# Patient Record
Sex: FEMALE | Race: WHITE | HISPANIC OR LATINO | URBAN - METROPOLITAN AREA
[De-identification: names, ages, dates, MRNs, and addresses within clinical notes are randomized per-mention and may not be internally consistent; named-entity substitution may affect disease eponyms.]

---

## 2023-03-31 ENCOUNTER — WEB ENCOUNTER (OUTPATIENT)
Dept: URBAN - METROPOLITAN AREA CLINIC 25 | Facility: CLINIC | Age: 33
End: 2023-03-31

## 2023-04-06 ENCOUNTER — OFFICE VISIT (OUTPATIENT)
Dept: URBAN - METROPOLITAN AREA CLINIC 25 | Facility: CLINIC | Age: 33
End: 2023-04-06
Payer: COMMERCIAL

## 2023-04-06 ENCOUNTER — DASHBOARD ENCOUNTERS (OUTPATIENT)
Age: 33
End: 2023-04-06

## 2023-04-06 VITALS
WEIGHT: 157.6 LBS | HEART RATE: 86 BPM | HEIGHT: 60 IN | DIASTOLIC BLOOD PRESSURE: 84 MMHG | SYSTOLIC BLOOD PRESSURE: 141 MMHG | BODY MASS INDEX: 30.94 KG/M2 | TEMPERATURE: 97.7 F

## 2023-04-06 DIAGNOSIS — K57.92 DIVERTICULITIS: ICD-10-CM

## 2023-04-06 DIAGNOSIS — K59.04 CHRONIC IDIOPATHIC CONSTIPATION: ICD-10-CM

## 2023-04-06 DIAGNOSIS — K62.5 RECTAL BLEEDING: ICD-10-CM

## 2023-04-06 PROBLEM — 307496006: Status: ACTIVE | Noted: 2023-04-06

## 2023-04-06 PROBLEM — 82934008: Status: ACTIVE | Noted: 2023-04-06

## 2023-04-06 PROCEDURE — 99204 OFFICE O/P NEW MOD 45 MIN: CPT

## 2023-04-06 RX ORDER — SODIUM, POTASSIUM,MAG SULFATES 17.5-3.13G
354 ML AS DIRECTED SOLUTION, RECONSTITUTED, ORAL ORAL
Qty: 1 KIT | Refills: 0 | OUTPATIENT
Start: 2023-04-06 | End: 2023-04-07

## 2023-04-06 RX ORDER — OMEPRAZOLE 40 MG/1
TAKE 1 CAPSULE (40 MG) BY ORAL ROUTE ONCE DAILY BEFORE A MEAL FOR 30 DAYS CAPSULE, DELAYED RELEASE PELLETS ORAL 1
Qty: 30 | Refills: 3 | Status: ON HOLD | COMMUNITY
Start: 2017-06-13

## 2023-04-06 RX ORDER — CIPROFLOXACIN 500 MG/1
1 TABLET TABLET, FILM COATED ORAL
Status: ACTIVE | COMMUNITY

## 2023-04-06 NOTE — HPI-TODAY'S VISIT:
Ms. Raphael is a 32y F, w/ a pmhx of chronic gastritis who presents today for hospital f/u for rectal bleeding. CT scan from 03/30/23 indicated diverticulosis without infection, however, wall thickening was seen, but could be correlated w/ underdistension, patient was treated for diverticulitis w/ abx regimen, fatty liver (in the absence of transaminitis) was also noted as well as a retroverted uterus.  (+) DIverticulosis  - Patient has been taking her abx and states that she has completed her 7 day course today  - Patient states prior to ER visit she was feeling lightheaded and fatigued which prompted her to be seen, she was not having abdominal pain at the time  (+) Rectal bleeding - Patient states that bleeding was occurring w/ every BM for 4 days prior to ER visit, she states 2 days after her ER visit her bleeding had stopped  - Blood was noted on tissue as well as on the commode, the patient reported she was on her menstrual cycle at the time  - JACOB was performed at ER and states that a small hemorrhoid was discovered  - Since starting abx, the patient has been having loose stools, she has taken probiotics which have helped make them more formed and denies foul smelling or very watery - Deneis famhx of colon CA/IBD (+) Constipation  - Patient reports regular bowel habits and denies straining, but will have occasional hard stools, 6-7 BM's a week  - Has not tied anything

## 2023-05-22 ENCOUNTER — WEB ENCOUNTER (OUTPATIENT)
Dept: URBAN - METROPOLITAN AREA SURGERY CENTER 20 | Facility: SURGERY CENTER | Age: 33
End: 2023-05-22

## 2023-05-22 ENCOUNTER — OFFICE VISIT (OUTPATIENT)
Dept: URBAN - METROPOLITAN AREA SURGERY CENTER 20 | Facility: SURGERY CENTER | Age: 33
End: 2023-05-22
Payer: COMMERCIAL

## 2023-05-22 DIAGNOSIS — Z87.19 H/O DIVERTICULITIS OF COLON: ICD-10-CM

## 2023-05-22 DIAGNOSIS — K92.1 HEMATOCHEZIA: ICD-10-CM

## 2023-05-22 PROCEDURE — 45378 DIAGNOSTIC COLONOSCOPY: CPT | Performed by: INTERNAL MEDICINE

## 2023-05-22 PROCEDURE — G8907 PT DOC NO EVENTS ON DISCHARG: HCPCS | Performed by: INTERNAL MEDICINE

## 2023-05-22 RX ORDER — OMEPRAZOLE 40 MG/1
TAKE 1 CAPSULE (40 MG) BY ORAL ROUTE ONCE DAILY BEFORE A MEAL FOR 30 DAYS CAPSULE, DELAYED RELEASE PELLETS ORAL 1
Qty: 30 | Refills: 3 | Status: ON HOLD | COMMUNITY
Start: 2017-06-13

## 2023-05-22 RX ORDER — CIPROFLOXACIN 500 MG/1
1 TABLET TABLET, FILM COATED ORAL
Status: ACTIVE | COMMUNITY